# Patient Record
Sex: FEMALE | Race: BLACK OR AFRICAN AMERICAN | URBAN - METROPOLITAN AREA
[De-identification: names, ages, dates, MRNs, and addresses within clinical notes are randomized per-mention and may not be internally consistent; named-entity substitution may affect disease eponyms.]

---

## 2017-08-16 ENCOUNTER — OFFICE VISIT (OUTPATIENT)
Dept: ENDOCRINOLOGY | Facility: CLINIC | Age: 42
End: 2017-08-16

## 2017-08-16 VITALS
HEART RATE: 90 BPM | DIASTOLIC BLOOD PRESSURE: 83 MMHG | SYSTOLIC BLOOD PRESSURE: 142 MMHG | BODY MASS INDEX: 26.85 KG/M2 | WEIGHT: 145.9 LBS | HEIGHT: 62 IN

## 2017-08-16 DIAGNOSIS — E05.00 GRAVES DISEASE: Primary | ICD-10-CM

## 2017-08-16 DIAGNOSIS — E05.00 GRAVES DISEASE: ICD-10-CM

## 2017-08-16 LAB
T3 SERPL-MCNC: 164 NG/DL (ref 60–181)
T4 FREE SERPL-MCNC: 1.44 NG/DL (ref 0.76–1.46)
TSH SERPL DL<=0.005 MIU/L-ACNC: <0.01 MU/L (ref 0.4–4)

## 2017-08-16 RX ORDER — ATENOLOL 50 MG/1
50 TABLET ORAL DAILY
Qty: 60 TABLET | Refills: 3 | Status: SHIPPED | OUTPATIENT
Start: 2017-08-16

## 2017-08-16 RX ORDER — METHIMAZOLE 10 MG/1
TABLET ORAL
Qty: 180 TABLET | Refills: 3 | Status: SHIPPED | OUTPATIENT
Start: 2017-08-16

## 2017-08-16 ASSESSMENT — PAIN SCALES - GENERAL: PAINLEVEL: NO PAIN (0)

## 2017-08-16 NOTE — PROGRESS NOTES
"ENDOCRINOLOGY VISIT NOTE  August 16, 2017    DIAGNOSIS:  A 41-year-old woman with Graves' disease, on methimazole therapy.      HISTORY OF PRESENT ILLNESS:  Ms. Ness is here for a follow-up visit.  She was diagnosed with Graves' disease in early summer of 2015.  She had an elevated uptake on thyroid scan and positive TSI of 4.3.  She opted for medical therapy and has been on methimazole.     She was last seen by this clinic in October 2016. At the time she was feeling completely well, \"back to normal\", though had not been taking her methimazole but about a week because she ran out of her supply. Thyroid function tests were TSH <0.01 mU/L, total T3 228 ng/dL, free T4 1.97 ng/dL.     She was continued on methimazole 20 mg daily and atenolol 50 mg daily.Repeat labs were ordered for mid-December but the patient never had them drawn.    Today Mrs. Ness is doing well. 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 The patient states she is compliant with her methimazole and atenolol and does not have issues with affordability of these medications. Her hair breaks from time to time but is not significantly changed form years prior. Reading small text is slowly becoming more difficult but denies any other changes to her vision. Denies heart palpitations, fevers, chills, chest pain, shortness of breath, nausea, vomiting, diarrhea, constipation, muscle aches, joint aches, heat or cold intolerance, dysphagia, odynophagia, skin changes.    REVIEW OF SYSTEMS:  As above, otherwise no acute cardiac, respiratory, other GI or  complaints.      PHYSICAL EXAMINATION:   /83 (BP Location: Left arm, Patient Position: Sitting, Cuff Size: Adult Regular)  Pulse 90  Ht 1.575 m (5' 2\")  Wt 66.2 kg (145 lb 14.4 oz)  BMI 26.69 kg/m2     GENERAL:  She appears well.   EYES:  She has no proptosis or lid lag, pupils equal and reactive to light bilaterally, " extraocular movement intact   NECK:  Non-tender goiter, easily palpable, diffuse.  NEUROLOGIC:  She has no tremor of the outstretched hands. Brachial reflexes are 2+ with normal relaxation phase.   CARDIAC:  Slightly tachycardic, regular rhythm, 2+ radial and DP arteries bilaterally        LABORATORY TESTS:    ENDO THYROID LABS-UMP TSH T4 FREE   Latest Ref Rng & Units 0.40 - 4.00 mU/L 0.76 - 1.46 ng/dL   10/26/2016 <0.01 (L) 1.97 (H)   8/16/2016 <0.01 (L) 1.56 (H)   6/1/2016 <0.01 (L) 1.65 (H)   11/18/2015 <0.01 (L) 2.21 (H)   10/6/2015 <0.01 (L) 3.03 (H)   8/18/2015 <0.01 (L) 1.84 (H)   7/21/2015 <0.01 (L) 1.91 (H)   5/29/2015 <0.01 (L) 5.62 (H)   5/27/2015 <0.01 (L) 6.26 (H)     ENDO THYROID LABS-UMP TRIIODOTHYRONINE(T3) THYROID STIM IMMUNOG   Latest Ref Rng & Units 60 - 181 ng/dL    10/26/2016 228 (H)    8/16/2016 167    6/1/2016 239 (H)    11/18/2015 356 (H)    10/6/2015 >460 (H)    8/18/2015 258 (H)    7/21/2015 5/29/2015 >460 (H)    5/27/2015 >460 (H) 4.3 (H)     RAIU 6/2015  The laboratory assessment for hyperthyroidism determined elevated free  T3 and T4 level and suppressed TSH.     The uptake at 24 hours was measured at 75.7%.  The normal range at 24  hours is from 10 to 30%. Uptake on right: 28.8%,  Uptake on Left: 46.9%.      Total computer estimated weight of the gland: 34.7 gm,  Right gland  weight: 12.8 gm,  Left gland weight: 20.1 gm.     Images of the gland demonstrates that the left lobe is asymmetrically  enlarged. No additional findings. No autonomous nodules were  identified.     Impression:  1. Concerning for Graves' disease. 75.7% uptake at 24 hours.  2. Mild asymmetric enlargement of the left lobe.     ASSESSMENT:  A 41-year-old woman who has had Graves' disease diagnosed in 2015 (TSI 4.3 in 2015, RAIU 75.75% 6/2015).  Patient has been on methimazole therapy 20 mg daily though her last thyroid function tests in 2016 demonstrated ongoing hyperthyroidism. There is some question whether she  is taking the methimazole reliably. She appears clinically euthyroid today. She remains uninterested in radioactive iodine therapy.     PLAN:   1.  TSH, free T4, total T3  2.  Continue methimazole 20 mg daily  3.  Continue atenolol 50 mg daily  4.  Follow-up in 6 months    [facility]  Internal Medicine PGY-2    Patient seen and discussed with Dr. Grant      Patient seen by me with resident MD.  Pt with Graves on methimazole.  We have had trouble normalizing pts TFT altho clinically she appears asymptomatic.  She is not interested in I131 Rx at this time.  Will continue methimazole and beta blocker, discussed importance of taking the medication regularly.  Findings and plan as above.    Ashwin Grant MD   [phone]

## 2017-08-16 NOTE — LETTER
"8/16/2017       RE: Timothy Ness  [address]   [facility] [address]     Dear Colleague,    Thank you for referring your patient, Timothy Ness, to the [facility] ENDOCRINOLOGY at [facility]. Please see a copy of my visit note below.    ENDOCRINOLOGY VISIT NOTE  August 16, 2017    DIAGNOSIS:  A 41-year-old woman with Graves' disease, on methimazole therapy.      HISTORY OF PRESENT ILLNESS:  Ms. Ness is here for a follow-up visit.  She was diagnosed with Graves' disease in early summer of 2015.  She had an elevated uptake on thyroid scan and positive TSI of 4.3.  She opted for medical therapy and has been on methimazole.     She was last seen by this clinic in October 2016. At the time she was feeling completely well, \"back to normal\", though had not been taking her methimazole but about a week because she ran out of her supply. Thyroid function tests were TSH <0.01 mU/L, total T3 228 ng/dL, free T4 1.97 ng/dL.     She was continued on methimazole 20 mg daily and atenolol 50 mg daily.Repeat labs were ordered for mid-December but the patient never had them drawn.    Today Mrs. Ness is doing well. 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 The patient states she is compliant with her methimazole and atenolol and does not have issues with affordability of these medications. Her hair breaks from time to time but is not significantly changed form years prior. Reading small text is slowly becoming more difficult but denies any other changes to her vision. Denies heart palpitations, fevers, chills, chest pain, shortness of breath, nausea, vomiting, diarrhea, constipation, muscle aches, joint aches, heat or cold intolerance, dysphagia, odynophagia, skin changes.    REVIEW OF SYSTEMS:  As above, otherwise no acute cardiac, respiratory, other GI or  complaints.      PHYSICAL " "EXAMINATION:   /83 (BP Location: Left arm, Patient Position: Sitting, Cuff Size: Adult Regular)  Pulse 90  Ht 1.575 m (5' 2\")  Wt 66.2 kg (145 lb 14.4 oz)  BMI 26.69 kg/m2     GENERAL:  She appears well.   EYES:  She has no proptosis or lid lag, pupils equal and reactive to light bilaterally, extraocular movement intact   NECK:  Non-tender goiter, easily palpable, diffuse.  NEUROLOGIC:  She has no tremor of the outstretched hands. Brachial reflexes are 2+ with normal relaxation phase.   CARDIAC:  Slightly tachycardic, regular rhythm, 2+ radial and DP arteries bilaterally        LABORATORY TESTS:    ENDO THYROID LABS-UMP TSH T4 FREE   Latest Ref Rng & Units 0.40 - 4.00 mU/L 0.76 - 1.46 ng/dL   10/26/2016 <0.01 (L) 1.97 (H)   8/16/2016 <0.01 (L) 1.56 (H)   6/1/2016 <0.01 (L) 1.65 (H)   11/18/2015 <0.01 (L) 2.21 (H)   10/6/2015 <0.01 (L) 3.03 (H)   8/18/2015 <0.01 (L) 1.84 (H)   7/21/2015 <0.01 (L) 1.91 (H)   5/29/2015 <0.01 (L) 5.62 (H)   5/27/2015 <0.01 (L) 6.26 (H)     ENDO THYROID LABS-UMP TRIIODOTHYRONINE(T3) THYROID STIM IMMUNOG   Latest Ref Rng & Units 60 - 181 ng/dL    10/26/2016 228 (H)    8/16/2016 167    6/1/2016 239 (H)    11/18/2015 356 (H)    10/6/2015 >460 (H)    8/18/2015 258 (H)    7/21/2015 5/29/2015 >460 (H)    5/27/2015 >460 (H) 4.3 (H)     RAIU 6/2015  The laboratory assessment for hyperthyroidism determined elevated free  T3 and T4 level and suppressed TSH.     The uptake at 24 hours was measured at 75.7%.  The normal range at 24  hours is from 10 to 30%. Uptake on right: 28.8%,  Uptake on Left: 46.9%.      Total computer estimated weight of the gland: 34.7 gm,  Right gland  weight: 12.8 gm,  Left gland weight: 20.1 gm.     Images of the gland demonstrates that the left lobe is asymmetrically  enlarged. No additional findings. No autonomous nodules were  identified.     Impression:  1. Concerning for Graves' disease. 75.7% uptake at 24 hours.  2. Mild asymmetric enlargement of the " left lobe.     ASSESSMENT:  A 41-year-old woman who has had Graves' disease diagnosed in 2015 (TSI 4.3 in 2015, RAIU 75.75% 6/2015).  Patient has been on methimazole therapy 20 mg daily though her last thyroid function tests in 2016 demonstrated ongoing hyperthyroidism. There is some question whether she is taking the methimazole reliably. She appears clinically euthyroid today. She remains uninterested in radioactive iodine therapy.     PLAN:   1.  TSH, free T4, total T3  2.  Continue methimazole 20 mg daily  3.  Continue atenolol 50 mg daily  4.  Follow-up in 6 months    Monty Cerda  Internal Medicine PGY-2    Patient seen and discussed with Dr. Grant      Patient seen by me with resident MD.  Pt with Graves on methimazole.  We have had trouble normalizing pts TFT altho clinically she appears asymptomatic.  She is not interested in I131 Rx at this time.  Will continue methimazole and beta blocker, discussed importance of taking the medication regularly.  Findings and plan as above.    Ashwin Grant MD   [phone]    Again, thank you for allowing me to participate in the care of your patient.      Sincerely,    Silvio Grant MD

## 2017-08-16 NOTE — LETTER
Date:September 6, 2017      Patient was self referred, no letter generated. Do not send.        [facility] Health Information

## 2017-08-16 NOTE — NURSING NOTE
"Chief Complaint   Patient presents with     RECHECK     thyrotoxicsosis f/u        Initial /83 (BP Location: Left arm, Patient Position: Sitting, Cuff Size: Adult Regular)  Pulse 90  Ht 1.575 m (5' 2\")  Wt 66.2 kg (145 lb 14.4 oz)  BMI 26.69 kg/m2 Estimated body mass index is 26.69 kg/(m^2) as calculated from the following:    Height as of this encounter: 1.575 m (5' 2\").    Weight as of this encounter: 66.2 kg (145 lb 14.4 oz).  Medication Reconciliation: complete       "

## 2017-08-16 NOTE — MR AVS SNAPSHOT
After Visit Summary   8/16/2017    Timothy Ness    MRN: [id number]           Patient Information     Date Of Birth          1975        Visit Information        Provider Department      8/16/2017 9:00 AM Silvio Grant MD [facility]        Today's Diagnoses     Graves disease    -  1      Care Instructions    Dr. Grant will follow up with results of lab tests.          Follow-ups after your visit        Follow-up notes from your care team     Return in about 6 months (around 2/16/2018).      Your next 10 appointments already scheduled     Aug 16, 2017  9:45 AM CDT   LAB with  LAB   [facility] ([facility])    [address] [place] [id number] [phone]           Patient must bring picture ID. Patient should be prepared to give a urine specimen  Please do not eat 10-12 hours before your appointment if you are coming in fasting for labs on lipids, cholesterol, or glucose (sugar). Pregnant women should follow their Care Team instructions. Water with medications is okay. Do not drink coffee or other fluids. If you have concerns about taking  your medications, please ask at office or if scheduling via [employer], send a message by clicking on Secure Messaging, Message Your Care Team.            Feb 19, 2018  9:00 AM CST   (Arrive by 8:45 AM)   RETURN ENDOCRINE with Deana Francisco MD   [facility] ([facility])    [address] [place] [id number] [phone]              Future tests that were ordered for you today     Open Future Orders        Priority Expected Expires Ordered    TSH Routine  8/16/2018 8/16/2017    T4 free Routine  8/16/2018 8/16/2017    T3 total Routine  8/16/2018 8/16/2017            Who to contact     Please call your clinic at [phone] to:    Ask questions about your health    Make or cancel appointments    Discuss your  "medicines    Learn about your test results    Speak to your doctor   If you have compliments or concerns about an experience at your clinic, or if you wish to file a complaint, please contact [facility] Patient Relations at [phone] or email us at [email or web address]         Additional Information About Your Visit        Christtube LLChart Information     EnStoraget is an electronic gateway that provides easy, online access to your medical records. With ChickRx, you can request a clinic appointment, read your test results, renew a prescription or communicate with your care team.     To sign up for ChickRx visit the website at [email or web address]   You will be asked to enter the access code listed below, as well as some personal information. Please follow the directions to create your username and password.     Your access code is: [email or web address]  Expires: 10/31/2017  6:30 AM     Your access code will  in 90 days. If you need help or a new code, please contact your [facility] or call [phone] for assistance.        Care EveryWhere ID     This is your Care EveryWhere ID. This could be used by other organizations to access your [place] medical records  [id number]        Your Vitals Were     Pulse Height BMI (Body Mass Index)             90 1.575 m (5' 2\") 26.69 kg/m2          Blood Pressure from Last 3 Encounters:   17 142/83   10/26/16 150/83   16 152/84    Weight from Last 3 Encounters:   17 66.2 kg (145 lb 14.4 oz)   10/26/16 64.1 kg (141 lb 4.8 oz)   16 63.5 kg (140 lb)                 Where to get your medicines      These medications were sent to [facility] - [place], [place] - [address] [place] [address] Ave  [address] [place] [address], [facility] [address]     Phone:  [phone]     atenolol 50 MG tablet    methimazole 10 MG tablet          Primary Care Provider    Physician No Ref-Primary       No address on file        Equal " Access to Services     [facility]: Hadii aad ku hademilianodarron Yesicaarpit, wanovada luqroger, qahanna serinahaydenshobha zhou. So [facility] [phone].    ATENCIÓN: Si habla español, tiene a judd disposición servicios gratuitos de asistencia lingüística. Llame al [phone].    We comply with applicable federal civil rights laws and [place] laws. We do not discriminate on the basis of race, color, national origin, age, disability sex, sexual orientation or gender identity.            Thank you!     Thank you for choosing [facility] ENDOCRINOLOGY  for your care. Our goal is always to provide you with excellent care. Hearing back from our patients is one way we can continue to improve our services. Please take a few minutes to complete the written survey that you may receive in the mail after your visit with us. Thank you!             Your Updated Medication List - Protect others around you: Learn how to safely use, store and throw away your medicines at www.disposemymeds.org.          This list is accurate as of: 8/16/17  9:39 AM.  Always use your most recent med list.                   Brand Name Dispense Instructions for use Diagnosis    atenolol 50 MG tablet    TENORMIN    60 tablet    Take 1 tablet (50 mg) by mouth daily    Graves disease       methimazole 10 MG tablet    TAPAZOLE    180 tablet    Take 2 pills a day. Can take both pills at same time in the AM.    Graves disease

## 2018-02-19 ENCOUNTER — OFFICE VISIT (OUTPATIENT)
Dept: ENDOCRINOLOGY | Facility: CLINIC | Age: 43
End: 2018-02-19
Payer: COMMERCIAL

## 2018-02-19 VITALS — DIASTOLIC BLOOD PRESSURE: 77 MMHG | BODY MASS INDEX: 26.48 KG/M2 | WEIGHT: 144.8 LBS | SYSTOLIC BLOOD PRESSURE: 157 MMHG

## 2018-02-19 DIAGNOSIS — E05.90 THYROTOXICOSIS: Primary | ICD-10-CM

## 2018-02-19 DIAGNOSIS — E05.00 GRAVES' DISEASE: Primary | ICD-10-CM

## 2018-02-19 DIAGNOSIS — Z91.148 POOR COMPLIANCE WITH MEDICATION: ICD-10-CM

## 2018-02-19 DIAGNOSIS — E04.9 GOITER: ICD-10-CM

## 2018-02-19 DIAGNOSIS — E05.90 THYROTOXICOSIS: ICD-10-CM

## 2018-02-19 LAB
T3 SERPL-MCNC: 284 NG/DL (ref 60–181)
T4 FREE SERPL-MCNC: 2.71 NG/DL (ref 0.76–1.46)
TSH SERPL DL<=0.005 MIU/L-ACNC: <0.01 MU/L (ref 0.4–4)

## 2018-02-19 ASSESSMENT — PAIN SCALES - GENERAL: PAINLEVEL: NO PAIN (0)

## 2018-02-19 NOTE — MR AVS SNAPSHOT
After Visit Summary   2018    Timothy Ness    MRN: [id number]           Patient Information     Date Of Birth          1975        Visit Information        Provider Department      2018 9:00 AM Deana Francisco MD [facility]         Follow-ups after your visit        Your next 10 appointments already scheduled     2018 10:15 AM CST   LAB with  LAB    [facility] ([facility])    [address] [place] [id number] [phone]           Please do not eat 10-12 hours before your appointment if you are coming in fasting for labs on lipids, cholesterol, or glucose (sugar). This does not apply to pregnant women. Water, hot tea and black coffee (with nothing added) are okay. Do not drink other fluids, diet soda or chew gum.              Who to contact     Please call your clinic at [phone] to:    Ask questions about your health    Make or cancel appointments    Discuss your medicines    Learn about your test results    Speak to your doctor            Additional Information About Your Visit        QiniuharPeeppl Media Information     Altar is an electronic gateway that provides easy, online access to your medical records. With Altar, you can request a clinic appointment, read your test results, renew a prescription or communicate with your care team.     To sign up for Altar visit the website at [email or web address]   You will be asked to enter the access code listed below, as well as some personal information. Please follow the directions to create your username and password.     Your access code is: [email or web address]  Expires: 2018  6:30 AM     Your access code will  in 90 days. If you need help or a new code, please contact your [facility] or call [phone] for assistance.        Care EveryWhere ID     This is your Care EveryWhere ID. This could be used by other  organizations to access your [place] medical records  [id number]        Your Vitals Were     BMI (Body Mass Index)                   26.48 kg/m2            Blood Pressure from Last 3 Encounters:   02/19/18 157/77   08/16/17 142/83   10/26/16 150/83    Weight from Last 3 Encounters:   02/19/18 65.7 kg (144 lb 12.8 oz)   08/16/17 66.2 kg (145 lb 14.4 oz)   10/26/16 64.1 kg (141 lb 4.8 oz)              Today, you had the following     No orders found for display       Primary Care Provider Fax #    Physician No Ref-Primary [phone]       No address on file        Equal Access to Services     TALYA ANTONY : Neela Mendoza, whitney marcelo, jean donohue, shobha woodson. So [facility] [phone].    ATENCIÓN: Si habla español, tiene a judd disposición servicios gratuitos de asistencia lingüística. Llame al [phone].    We comply with applicable federal civil rights laws and [place] laws. We do not discriminate on the basis of race, color, national origin, age, disability, sex, sexual orientation, or gender identity.            Thank you!     Thank you for choosing [facility]  for your care. Our goal is always to provide you with excellent care. Hearing back from our patients is one way we can continue to improve our services. Please take a few minutes to complete the written survey that you may receive in the mail after your visit with us. Thank you!             Your Updated Medication List - Protect others around you: Learn how to safely use, store and throw away your medicines at www.disposemymeds.org.          This list is accurate as of 2/19/18  9:53 AM.  Always use your most recent med list.                   Brand Name Dispense Instructions for use Diagnosis    atenolol 50 MG tablet    TENORMIN    60 tablet    Take 1 tablet (50 mg) by mouth daily    Graves disease       methimazole 10 MG tablet    TAPAZOLE    180 tablet    Take 2 pills a day.  Can take both pills at same time in the AM.    Graves disease

## 2018-02-19 NOTE — NURSING NOTE
"Chief Complaint   Patient presents with     RECHECK     Thyroid       Initial /77 (BP Location: Right arm, Patient Position: Sitting, Cuff Size: Adult Regular)  Wt 65.7 kg (144 lb 12.8 oz)  BMI 26.48 kg/m2 Estimated body mass index is 26.48 kg/(m^2) as calculated from the following:    Height as of 8/16/17: 1.575 m (5' 2\").    Weight as of this encounter: 65.7 kg (144 lb 12.8 oz).  Medication Reconciliation: complete    "

## 2018-02-19 NOTE — LETTER
2/19/2018       RE: Timothy Ness  [address]  [address]   [facility] [id number]     Dear Colleague,    Thank you for referring your patient, Timothy Ness, to the [facility] ENDOCRINOLOGY at [facility]. Please see a copy of my visit note below.    - Endocrinology  -    Reason for visit/consult:  Data Unavailable    Primary care provider: No Ref-Primary, Physician    HPI: A 43-year-old woman with Graves' disease diagnosed 2015 by uptake scan and TSI 4.3, on methimazole and beta-blocker therapy.  Patient came for follow-up, came with her son , she was last seen by Dr. Grant on August 16, 2017, during that time she was emphasized for medication compliance.  Her free T4 has been trending down at that time with free T4 1.44.     She was continued on methimazole 20 mg daily and atenolol 50 mg daily, according to the patient she is compliant to the medication.  She states she has been doing well, when she was diagnosed she had a palpitation and tremor however these were resolved.  Denies heart palpitations, fevers, chills, chest pain, shortness of breath, nausea, vomiting, diarrhea, constipation, muscle aches, joint aches, heat or cold intolerance, dysphagia, odynophagia, skin changes.      Past Medical/Surgical History:  Past Medical History:   Diagnosis Date     Graves disease 2015    methimazole and atenolol     NO ACTIVE PROBLEMS      No past surgical history on file.    Allergies:  No Known Allergies    Current Medications   Current Outpatient Prescriptions   Medication     methimazole (TAPAZOLE) 10 MG tablet     atenolol (TENORMIN) 50 MG tablet     No current facility-administered medications for this visit.        Family History:  No family history on file.  She does not have other family history of thyroid disease    Social History:  Social History   Substance Use Topics     Smoking status: Never Smoker     Smokeless tobacco: Not on file     Alcohol use  No       ROS:  Full review of systems taken with the help of the intake sheet. Otherwise a complete 14 point review of systems was taken and is negative unless stated in the history above.        Physical Exam:   Blood pressure 157/77, weight 65.7 kg (144 lb 12.8 oz),  General: well appearing, no acute distress, pleasant and conversant,   Mental Status/neuro: alert and oriented  Face: symmetrical, normal facial color  Eyes: anicteric, PERRL, no proptosis or lid lag  Neck: suppler, no lymphadenopahty  Thyroid: Enlarged size ×2  and soft texture, no nodule palpable, no bruits  Hands: no tremor  Heart: regular rhythm, S1S2, no murmur appreciated  Lung: clear to auscultation bilaterally  Abdomen: soft, NT/ND, no hepatomegaly  Legs: no swelling or edema      Thyroid uptake scan 6/3/2015: I personally reviewed the original images and agree with the below reports.  Reminded her that she has Graves' disease.           Assessment and Plan  43 year old female with Graves' disease since 2015,     # It seems she is more compliant to her medication according to the patient, and today her clinical symptom seems improved.    -TSH, free T4, total T3 today  -We will confirm the dose of the medication however meanwhile continue current dose of the methimazole 20 mg daily and atenolol 50 mg daily  -Return to clinic with me in October 2018    --- Addendum--  Today's blood work shows her hyperthyroidism got worse, also I did not notice obvious clinical signs of hyperthyroidism.   Patient had history of questionable compliance previously, therefore I called the patient to emphasize take methimazole 20 mg every day without changing the dose, also continue current atenolol.  And instructed patient to repeat thyroid function test at the end of March 2018.      Ref. Range 8/16/2016 12:11 10/26/2016 08:28 8/16/2017 09:55 2/19/2018 10:24   T4 Free Latest Ref Range: 0.76 - 1.46 ng/dL 1.56 (H) 1.97 (H) 1.44 2.71 (H)   Triiodothyronine (T3)  Latest Ref Range: 60 - 181 ng/dL 167 228 (H) 164 284 (H)   TSH Latest Ref Range: 0.40 - 4.00 mU/L <0.01 (L) <0.01 (L) <0.01 (L) <0.01 (L)     I spent 30 minutes with this patient face to face and explained the conditions and plans (more than 50% of time was counseling/coordination of care) . The patient understood and is satisfied with today's visit. Return to clinic with me in 8 months.         Deana Francisco MD  Staff Physician  Endocrinology and Metabolism  License: [id number]

## 2018-02-19 NOTE — PROGRESS NOTES
- Endocrinology  -    Reason for visit/consult:  Data Unavailable    Primary care provider: No Ref-Primary, Physician    HPI: A 43-year-old woman with Graves' disease diagnosed 2015 by uptake scan and TSI 4.3, on methimazole and beta-blocker therapy.  Patient came for follow-up, came with her son , she was last seen by Dr. Grant on August 16, 2017, during that time she was emphasized for medication compliance.  Her free T4 has been trending down at that time with free T4 1.44.     She was continued on methimazole 20 mg daily and atenolol 50 mg daily, according to the patient she is compliant to the medication.  She states she has been doing well, when she was diagnosed she had a palpitation and tremor however these were resolved.  Denies heart palpitations, fevers, chills, chest pain, shortness of breath, nausea, vomiting, diarrhea, constipation, muscle aches, joint aches, heat or cold intolerance, dysphagia, odynophagia, skin changes.      Past Medical/Surgical History:  Past Medical History:   Diagnosis Date     Graves disease 2015    methimazole and atenolol     NO ACTIVE PROBLEMS      No past surgical history on file.    Allergies:  No Known Allergies    Current Medications   Current Outpatient Prescriptions   Medication     methimazole (TAPAZOLE) 10 MG tablet     atenolol (TENORMIN) 50 MG tablet     No current facility-administered medications for this visit.        Family History:  No family history on file.  She does not have other family history of thyroid disease    Social History:  Social History   Substance Use Topics     Smoking status: Never Smoker     Smokeless tobacco: Not on file     Alcohol use No       ROS:  Full review of systems taken with the help of the intake sheet. Otherwise a complete 14 point review of systems was taken and is negative unless stated in the history above.        Physical Exam:   Blood pressure  157/77, weight 65.7 kg (144 lb 12.8 oz),  General: well appearing, no acute distress, pleasant and conversant,   Mental Status/neuro: alert and oriented  Face: symmetrical, normal facial color  Eyes: anicteric, PERRL, no proptosis or lid lag  Neck: suppler, no lymphadenopahty  Thyroid: Enlarged size ×2 and soft texture, no nodule palpable, no bruits  Hands: no tremor  Heart: regular rhythm, S1S2, no murmur appreciated  Lung: clear to auscultation bilaterally  Abdomen: soft, NT/ND, no hepatomegaly  Legs: no swelling or edema      Thyroid uptake scan 6/3/2015: I personally reviewed the original images and agree with the below reports.  Reminded her that she has Graves' disease.           Assessment and Plan  43 year old female with Graves' disease since 2015,     # It seems she is more compliant to her medication according to the patient, and today her clinical symptom seems improved.    -TSH, free T4, total T3 today  -We will confirm the dose of the medication however meanwhile continue current dose of the methimazole 20 mg daily and atenolol 50 mg daily  -Return to clinic with me in October 2018    --- Addendum--  Today's blood work shows her hyperthyroidism got worse, also I did not notice obvious clinical signs of hyperthyroidism.   Patient had history of questionable compliance previously, therefore I called the patient to emphasize take methimazole 20 mg every day without changing the dose, also continue current atenolol.  And instructed patient to repeat thyroid function test at the end of March 2018.      Ref. Range 8/16/2016 12:11 10/26/2016 08:28 8/16/2017 09:55 2/19/2018 10:24   T4 Free Latest Ref Range: 0.76 - 1.46 ng/dL 1.56 (H) 1.97 (H) 1.44 2.71 (H)   Triiodothyronine (T3) Latest Ref Range: 60 - 181 ng/dL 167 228 (H) 164 284 (H)   TSH Latest Ref Range: 0.40 - 4.00 mU/L <0.01 (L) <0.01 (L) <0.01 (L) <0.01 (L)     I spent 30 minutes with this patient face to face and explained the conditions and plans  (more than 50% of time was counseling/coordination of care) . The patient understood and is satisfied with today's visit. Return to clinic with me in 8 months.         Deana Francisco MD  Staff Physician  Endocrinology and Metabolism  License: [id number]